# Patient Record
Sex: MALE | URBAN - METROPOLITAN AREA
[De-identification: names, ages, dates, MRNs, and addresses within clinical notes are randomized per-mention and may not be internally consistent; named-entity substitution may affect disease eponyms.]

---

## 2017-02-20 ENCOUNTER — TELEPHONE (OUTPATIENT)
Dept: NEUROLOGY | Facility: CLINIC | Age: 57
End: 2017-02-20

## 2017-02-20 NOTE — TELEPHONE ENCOUNTER
----- Message from Ivory Salgado sent at 2/17/2017 12:35 PM CST -----  Contact: sreekanth(  office) [phone]  sreekanth is calling to schedule a np appt.pls call back Monday between 8:30-5:00 pm.thanks

## 2017-02-20 NOTE — TELEPHONE ENCOUNTER
Spoke to Yara with Dr Casanova office, they will fax over clinic notes. Pt needs appt for Trigeminal neuralgia with Dr Taylor.